# Patient Record
Sex: MALE | Race: BLACK OR AFRICAN AMERICAN | Employment: FULL TIME | URBAN - METROPOLITAN AREA
[De-identification: names, ages, dates, MRNs, and addresses within clinical notes are randomized per-mention and may not be internally consistent; named-entity substitution may affect disease eponyms.]

---

## 2018-07-17 ENCOUNTER — HOSPITAL ENCOUNTER (OUTPATIENT)
Dept: MRI IMAGING | Age: 38
Discharge: HOME | End: 2018-07-17
Attending: ORTHOPAEDIC SURGERY
Payer: COMMERCIAL

## 2018-07-17 DIAGNOSIS — S86.019A RUPTURE, TENDON, ACHILLES: ICD-10-CM

## 2018-07-17 PROCEDURE — 73721 MRI JNT OF LWR EXTRE W/O DYE: CPT

## 2022-05-27 ENCOUNTER — HOSPITAL ENCOUNTER (EMERGENCY)
Age: 42
Discharge: LEFT BEFORE DISCHARGE | End: 2022-05-27
Attending: STUDENT IN AN ORGANIZED HEALTH CARE EDUCATION/TRAINING PROGRAM
Payer: COMMERCIAL

## 2022-05-27 VITALS
HEIGHT: 70 IN | RESPIRATION RATE: 18 BRPM | OXYGEN SATURATION: 98 % | DIASTOLIC BLOOD PRESSURE: 91 MMHG | SYSTOLIC BLOOD PRESSURE: 116 MMHG | BODY MASS INDEX: 23.62 KG/M2 | WEIGHT: 165 LBS | TEMPERATURE: 98.4 F | HEART RATE: 80 BPM

## 2022-05-27 PROCEDURE — 75810000275 HC EMERGENCY DEPT VISIT NO LEVEL OF CARE

## 2022-05-27 NOTE — ED TRIAGE NOTES
C/o rash to face, neck, and arms since last week. Saw dermatology early this week, but meds that were prescribed are not helping. C/o nasal congestion with yellow nasal discharge this week. Denies fever.

## 2022-06-02 ENCOUNTER — HOSPITAL ENCOUNTER (EMERGENCY)
Age: 42
Discharge: HOME | End: 2022-06-02
Attending: STUDENT IN AN ORGANIZED HEALTH CARE EDUCATION/TRAINING PROGRAM
Payer: COMMERCIAL

## 2022-06-02 VITALS
OXYGEN SATURATION: 98 % | TEMPERATURE: 98.4 F | BODY MASS INDEX: 23.91 KG/M2 | HEIGHT: 70 IN | HEART RATE: 63 BPM | WEIGHT: 167 LBS | RESPIRATION RATE: 16 BRPM | DIASTOLIC BLOOD PRESSURE: 64 MMHG | SYSTOLIC BLOOD PRESSURE: 116 MMHG

## 2022-06-02 DIAGNOSIS — L30.9 DERMATITIS: Primary | ICD-10-CM

## 2022-06-02 PROCEDURE — 99283 EMERGENCY DEPT VISIT LOW MDM: CPT

## 2022-06-02 RX ORDER — DESONIDE 0.5 MG/G
CREAM TOPICAL
COMMUNITY
Start: 2022-05-23

## 2022-06-02 RX ORDER — KETOCONAZOLE 20 MG/G
CREAM TOPICAL
Qty: 15 G | Refills: 0 | Status: SHIPPED | OUTPATIENT
Start: 2022-06-02

## 2022-06-02 RX ORDER — PREDNISONE 20 MG/1
TABLET ORAL
COMMUNITY
Start: 2022-05-27

## 2022-06-02 NOTE — ED NOTES
Assumed care of patient, A&Ox4, Resp even and unlabored, NAD noted or indicated. Pt is able to ambulate with a steady gait. Pt presents with complaints of rash and redness on neck and face, denies nausea, vomiting, SOB, and chest pain. Pt appears well. At baseline and self sufficient. Pt connected to monitor, MD EDWIN at bedside. This RN to continue to monitor and maintain safety. History reviewed. No pertinent past medical history.     Past Surgical History:   Procedure Laterality Date    HX ORTHOPAEDIC Left 2018    achilles tendon repair

## 2022-06-02 NOTE — ED TRIAGE NOTES
Patient presents with c/o rash to face and neck that began approximately one week ago. He states that he was examined by Dermatology last Monday and received prescriptions for topical cream and other medication. He states no resolution from medications prescribed by Dermatology. States being evaluated at [facility] last Friday and received prescription for Prednisone and Sudafed. Advises that sudafed was prescribed related to nasal congestion/sinus issues. He denies relief of rash with prednisone and advises of ongoing nasal congestion despite Sudafed use. Patient noted to have raised reddened rash to face and neck.

## 2022-06-02 NOTE — ED NOTES
Patient discharged at this time. This RN reviewed discharge instructions at this time with the patient. patient verbalized understanding and does not have any questions. Pt ambulatory upon discharge, & in stable condition. Pt armband removed & shredded.